# Patient Record
(demographics unavailable — no encounter records)

---

## 2025-01-06 NOTE — ASSESSMENT
[FreeTextEntry1] : 1) [place] Dysfn causing ear fullness 2) URI resolving 3) HTN  controlled   4) Diabetic Eye exam advised 5) CPE August 2025 6) MG: macrolides, fluroquinolones and aminoglycosides, polymyxins may interfere with neuromuscular transmission and potentially exacerbate MG  MAY take cephalosporins if no cross reactivity PCN, SMZ-TMP, tigecycline

## 2025-01-06 NOTE — PHYSICAL EXAM
[No Acute Distress] : no acute distress [Well-Appearing] : well-appearing [Clear to Auscultation] : lungs were clear to auscultation bilaterally [Normal Rate] : normal rate  [Normal S1, S2] : normal S1 and S2 [No Edema] : there was no peripheral edema

## 2025-01-06 NOTE — HISTORY OF PRESENT ILLNESS
[de-identified] : 51 y*o L*kyle term Myasthenia gravis Dec 31 got wet  Thinks this lowered her immunity incre illness *On New Year's Day felt tired N*o fever, n*o s*ore thr*oat, s*ome c*ough PND Had Myasthenia exacerbati*on had t*o get ster*oids Medr*ol d*osepak N*o purulent D/C   T*o*ok Sudafed t*myriam. Getting better  Taking insulin, Trulicty, jardiance *ophth Jan 2024 sinus pain  Cipr*o tetracycline and macr*olides w*orsen  Perimen*opausal. N*o menses x 3 m*onths  This